# Patient Record
Sex: MALE | Race: ASIAN | NOT HISPANIC OR LATINO | Employment: FULL TIME | URBAN - METROPOLITAN AREA
[De-identification: names, ages, dates, MRNs, and addresses within clinical notes are randomized per-mention and may not be internally consistent; named-entity substitution may affect disease eponyms.]

---

## 2017-01-21 ENCOUNTER — HOSPITAL ENCOUNTER (EMERGENCY)
Facility: HOSPITAL | Age: 52
Discharge: HOME | End: 2017-01-21
Attending: EMERGENCY MEDICINE
Payer: MEDICAID

## 2017-01-21 VITALS
HEIGHT: 64 IN | TEMPERATURE: 98 F | HEART RATE: 95 BPM | OXYGEN SATURATION: 95 % | WEIGHT: 160 LBS | SYSTOLIC BLOOD PRESSURE: 159 MMHG | RESPIRATION RATE: 20 BRPM | BODY MASS INDEX: 27.31 KG/M2 | DIASTOLIC BLOOD PRESSURE: 89 MMHG

## 2017-01-21 DIAGNOSIS — H10.9 CONJUNCTIVITIS OF RIGHT EYE, UNSPECIFIED CONJUNCTIVITIS TYPE: Primary | ICD-10-CM

## 2017-01-21 PROCEDURE — 99283 EMERGENCY DEPT VISIT LOW MDM: CPT

## 2017-01-21 RX ORDER — LISINOPRIL 10 MG/1
10 TABLET ORAL DAILY
COMMUNITY

## 2017-01-21 RX ORDER — METFORMIN HYDROCHLORIDE 500 MG/1
500 TABLET ORAL 2 TIMES DAILY WITH MEALS
COMMUNITY

## 2017-01-21 RX ORDER — TOBRAMYCIN 3 MG/ML
1 SOLUTION/ DROPS OPHTHALMIC EVERY 4 HOURS
Qty: 60 DROP | Refills: 0 | Status: SHIPPED | OUTPATIENT
Start: 2017-01-21 | End: 2017-01-31

## 2017-01-21 NOTE — ED AVS SNAPSHOT
[facility]  [address] [address] [facility] [address]               Te Loyola   2017  9:05 PM   ED    Description:  Male : 1965   Department:  [facility]           Your Care was Coordinated By:     Provider Role From To    Leah Duncan MD Attending Provider [id number] --      Reason for Visit     Eye Problem           Diagnoses this Visit        Comments    Conjunctivitis of right eye, unspecified conjunctivitis type    -  Primary       ED Disposition     None           To Do List           Follow-up Information     Follow up with Stewart De La Cruz MD. Schedule an appointment as soon as possible for a visit in 2 days.    Specialty:  Internal Medicine    Why:  Return to the Emergency Room, If symptoms worsen    Contact information:    [address] [facility]  [address] [place] [address]  [phone]         These Medications        Disp Refills Start End    tobramycin sulfate 0.3% (TOBREX) 0.3 % ophthalmic solution 60 drop 0 2017    Place 1 drop into the right eye every 4 (four) hours. - Right Eye      [facility]     [facility] -  Assistance  Registered nurses in the [facility] provide clinical advisement, health education, appointment booking, and other advisory services.  Call for this free service at [phone].             Medications           Message regarding Medications     Verify the changes and/or additions to your medication regime listed below are the same as discussed with your clinician today.  If any of these changes or additions are incorrect, please notify your healthcare provider.        START taking these NEW medications        Refills    tobramycin sulfate 0.3% (TOBREX) 0.3 % ophthalmic solution 0    Sig: Place 1 drop into the right eye every 4 (four) hours.    Class: Print    Route: Right Eye           Verify that the below list of  "medications is an accurate representation of the medications you are currently taking.  If none reported, the list may be blank. If incorrect, please contact your healthcare provider. Carry this list with you in case of emergency.           Current Medications     tobramycin sulfate 0.3% (TOBREX) 0.3 % ophthalmic solution Place 1 drop into the right eye every 4 (four) hours.           Clinical Reference Information           Your Vitals Were     BP Pulse Temp Resp Height Weight    159/89 (BP Location: Right arm, Patient Position: Sitting) 95 97.8 °F (36.6 °C) (Oral) 20 5' 4" (1.626 m) 72.6 kg (160 lb)    SpO2 BMI             95% 27.46 kg/m2         Allergies as of 1/21/2017     No Known Allergies      Immunizations Administered on Date of Encounter - 1/21/2017     None      ED Micro, Lab, POCT     None      ED Imaging Orders     None      Discharge References/Attachments     CONJUNCTIVITIS, WHAT IS? (ENGLISH)    CONJUNCTIVITIS, NON-SPECIFIC (ENGLISH)      [facility] Sign-Up     Activating your [facility] account is as easy as 1-2-3!     1) Visit [email or web address], select Sign Up Now, enter this activation code and your date of birth, then select Next.  [email or web address]  Expires: 3/7/2017  9:11 PM      2) Create a username and password to use when you visit [facility] in the future and select a security question in case you lose your password and select Next.    3) Enter your e-mail address and click Sign Up!    Additional Information  If you have questions, please e-mail [email or web address] or call [phone] to talk to our [facility] staff. Remember, [facility] is NOT to be used for urgent needs. For medical emergencies, dial 911.         Smoking Cessation     If you would like to quit smoking:   You may be eligible for free services if you are a [place] resident and started smoking cigarettes before September 1, 1988.  Call the [employer]oking [employer]ess[employer] ([employer]) toll free at (631) 126-1837 or (252) 765-7455.   Call " 1-800-QUIT-NOW if you do not meet the above criteria.             [facility] complies with applicable Federal civil rights laws and does not discriminate on the basis of race, color, national origin, age, disability, or sex.        Language Assistance Services     ATTENTION: Language assistance services are available, free of charge. Please call [phone].      ATENCIÓN: Si habla español, tiene a jurado disposición servicios gratuitos de asistencia lingüística. Llame al [phone].     CHÚ Ý: N?u b?n nói Ti?ng Vi?t, có các d?ch v? h? tr? ngôn ng? mi?n phí dành cho b?n. G?i s? [phone].

## 2017-01-22 NOTE — ED PROVIDER NOTES
SCRIBE #1 NOTE: I, Phylicia Chaudhari, am scribing for, and in the presence of, Leah Duncan MD. I have scribed the entire note.      History      Chief Complaint   Patient presents with    Eye Problem     reports having itchy eyes for the last three days, reports right eye is worse than left eye        Review of patient's allergies indicates:  No Known Allergies     HPI   HPI    1/21/2017, 9:10 PM   History obtained from the patient      History of Present Illness: Te Loyola is a 51 y.o. male patient with PMHx of DM and HTN who presents to the Emergency Department for bilateral eye itching which onset gradually a few days ago. Symptoms are constant and moderate in severity. No mitigating or exacerbating factors reported. Associated sxs include clear drainage from bilateral eyes. Patient denies any fever, chills, HA, visual disturbance, photophobia, eye pain, foreign body sensation, weakness/numbness, dizziness, and all other sxs at this time. Prior Tx includes Benadryl and eye drops with little relief of sxs. No further complaints or concerns at this time.       Arrival mode: Personal vehicle      PCP: Stewart De La Cruz MD       Past Medical History:  Past Medical History   Diagnosis Date    Diabetes mellitus     Hypertension        Past Surgical History:  History reviewed. No pertinent past surgical history.      Family History:  History reviewed. No pertinent family history.      Social History:    Social History Main Topics    Smoking status: Unknown if ever smoked    Smokeless tobacco: Unknown if ever used    Alcohol Use: Unknown drinking history    Drug Use: Unknown if ever used    Sexual Activity: Unknown       ROS   Review of Systems   Constitutional: Negative for chills and fever.   HENT: Negative for sore throat.    Eyes: Positive for discharge (bilateral) and itching (bilateral). Negative for photophobia, pain and visual disturbance.   Respiratory: Negative for shortness of  "breath.    Cardiovascular: Negative for chest pain.   Gastrointestinal: Negative for nausea.   Genitourinary: Negative for dysuria.   Musculoskeletal: Negative for back pain.   Skin: Negative for rash.   Neurological: Negative for dizziness, weakness, numbness and headaches.   Hematological: Does not bruise/bleed easily.   All other systems reviewed and are negative.      Physical Exam    Initial Vitals   BP Pulse Resp Temp SpO2   01/21/17 2058 01/21/17 2058 01/21/17 2058 01/21/17 2058 01/21/17 2058   159/89 95 20 97.8 °F (36.6 °C) 95 %      Physical Exam  Nursing Notes and Vital Signs Reviewed.  Constitutional: Patient is in no acute distress. Awake and alert. Well-developed and well-nourished.  Head: Atraumatic. Normocephalic.  Eyes: PERRL. EOM intact. Conjunctivae are not pale. No scleral icterus. Conjunctival erythema and injection. No proptosis. No periorbital swelling. Clear drainage noted. No photophobia. Visual fields are intact.  ENT: Mucous membranes are moist. Oropharynx is clear and symmetric.    Neck: Supple. Full ROM. No lymphadenopathy.  Cardiovascular: Regular rate. Regular rhythm. No murmurs, rubs, or gallops. Distal pulses are 2+ and symmetric.  Pulmonary/Chest: No respiratory distress. Clear to auscultation bilaterally. No wheezing, rales, or rhonchi.  Abdominal: Soft and non-distended.  There is no tenderness.     Musculoskeletal: Moves all extremities. No obvious deformities. No edema. No calf tenderness.  Skin: Warm and dry.  Neurological:  Alert, awake, and appropriate.  Normal speech.  No acute focal neurological deficits are appreciated.  Psychiatric: Normal affect. Good eye contact. Appropriate in content.    ED Course    Procedures  ED Vital Signs:  Vitals:    01/21/17 2058   BP: (!) 159/89   Pulse: 95   Resp: 20   Temp: 97.8 °F (36.6 °C)   TempSrc: Oral   SpO2: 95%   Weight: 72.6 kg (160 lb)   Height: 5' 4" (1.626 m)              The Emergency Provider reviewed the vital signs and test " results, which are outlined above.    ED Discussion     9:14 PM: Discussed pt dx and plan of tx. Advised pt to treat sxs with cold compresses and continue Benadryl every 6 hours and Claritin. Gave pt all f/u and return to the ED instructions. All questions and concerns were addressed at this time. Pt expresses understanding of information and instructions, and is comfortable with plan to discharge. Pt is stable for discharge.      ED Medication(s):  Medications - No data to display    Discharge Medication List as of 1/21/2017  9:11 PM      START taking these medications    Details   tobramycin sulfate 0.3% (TOBREX) 0.3 % ophthalmic solution Place 1 drop into the right eye every 4 (four) hours., Starting 1/21/2017, Until Tue 1/31/17, Print             Follow-up Information     Follow up with Stewart De La Cruz MD. Schedule an appointment as soon as possible for a visit in 2 days.    Specialty:  Internal Medicine    Why:  Return to the Emergency Room, If symptoms worsen    Contact information:    [address] [address] [address]  [phone]              Medical Decision Making              Scribe Attestation:   Scribe #1: I performed the above scribed service and the documentation accurately describes the services I performed. I attest to the accuracy of the note.    Attending:   Physician Attestation Statement for Scribe #1: I, Leah Duncan MD, personally performed the services described in this documentation, as scribed by Phylicia Chaudhari, in my presence, and it is both accurate and complete.          Clinical Impression       ICD-10-CM ICD-9-CM   1. Conjunctivitis of right eye, unspecified conjunctivitis type H10.9 372.30       Disposition:   Disposition: Discharged  Condition: Stable         Leah Duncan MD  01/21/17 0083